# Patient Record
(demographics unavailable — no encounter records)

---

## 2024-12-17 NOTE — PHYSICAL EXAM
[de-identified] : Patient is well nourished, well-developed, in no acute distress, with appropriate mood and affect. The patient is oriented to time, place, and person. Respirations are even and unlabored. Gait evaluation does not reveal a limp. There is no inguinal adenopathy.  The right limb is well-perfused and showed 2+ dp/pt pulses, without skin lesions, shows a grossly normal motor and sensory examination. Examination of the hip shows a well-healed surgical scar. Hip motion is full and painless from 0-90 degrees extension to flexion, 20 degrees adduction and 20 degrees abduction, and 15 degrees internal and 30 degrees external rotation. FADIR is negative and ALVIN is negative. Stinchfield test is negative. The left limb is well-perfused and showed 2+ dp/pt pulses, without skin lesions, shows a grossly normal motor and sensory examination. Examination of the hip shows no skin lesions. Hip motion is full and painless from 0-90 degrees extension to flexion, 20 degrees adduction and 20 degrees abduction, and 15 degrees internal and 30 degrees external rotation. FADIR is mildly positive and ALVIN is mildly positive. Stinchfield test is mildly positive.  Leg lengths are approximately equal. Both hips are stable and muscle strength is normal with good strength with resisted abduction and adduction. Pedal pulses are palpable. [de-identified] : AP pelvis, AP and lateral hip radiographs of the right hip were ordered and obtained in the office and demonstrate satisfactory position and alignment of the components are present. No signs of loosening are seen.  On the AP pelvis left hip is also seen which demonstrates left hip osteoarthritis.

## 2024-12-17 NOTE — HISTORY OF PRESENT ILLNESS
[de-identified] :  This is very nice 71-year-old gentleman experiencing left hip and groin and thigh pain, which is moderate in intensity. The pain somewhat limits activities of daily living. Walking tolerance is somewhat reduced.  He has done very well with the right total of arthroplasty and thrilled the outcome from this.  The patient denies any radiation of the pain to the feet and it is not associated with numbness, tingling, or weakness.

## 2024-12-17 NOTE — DISCUSSION/SUMMARY
[de-identified] : This patient is left hip osteoarthritis and well-functioning right total of arthroplasty.  The patient is not an appropriate candidate for surgical intervention at this time. An extensive discussion was conducted on the natural history of the disease and the variety of surgical and non-surgical options available to the patient including, but not limited to non-steroidal anti-inflammatory medications, steroid injections, physical therapy, maintenance of ideal body weight, and reduction of activity.  I recommend meloxicam for this diagnosis however he prefers take over-the-counter NSAIDs.  Pain.  Home exercise program daily recommended.   Follow-up for the left hip in 6 months and for the right hip in 1 year.